# Patient Record
Sex: FEMALE | Race: BLACK OR AFRICAN AMERICAN | NOT HISPANIC OR LATINO | Employment: OTHER | URBAN - METROPOLITAN AREA
[De-identification: names, ages, dates, MRNs, and addresses within clinical notes are randomized per-mention and may not be internally consistent; named-entity substitution may affect disease eponyms.]

---

## 2018-12-18 ENCOUNTER — CLINICAL SUPPORT (OUTPATIENT)
Dept: AUDIOLOGY | Facility: CLINIC | Age: 68
End: 2018-12-18
Payer: MEDICARE

## 2018-12-18 DIAGNOSIS — H81.8X9 OTHER DISORDERS OF VESTIBULAR FUNCTION, UNSPECIFIED EAR: Primary | ICD-10-CM

## 2018-12-18 PROCEDURE — 92537 CALORIC VSTBLR TEST W/REC: CPT | Mod: S$GLB,,, | Performed by: AUDIOLOGIST

## 2018-12-18 PROCEDURE — 92540 BASIC VESTIBULAR EVALUATION: CPT | Mod: S$GLB,,, | Performed by: AUDIOLOGIST

## 2018-12-18 NOTE — Clinical Note
Please see results of today's VNG evaluation.  Please let me know if I can provide additional information.Thank you,Tracie Macias, CCC-A

## 2018-12-18 NOTE — PROGRESS NOTES
VNG/Postuography Evaluation    Referring physician:  Dr. Clement    68 y.o. female complains of dizziness, lightheadedness, and imbalance.  Symptoms are provoked by quick head turns, airising from bed, reclining into bed, bending over and looking up and have been recurring over the past 4 months.  Ms. Babin reported that her dizziness occurs every day and each episode lasts for a few seconds.  She denied taking any medication that would affect today's testing.    Gaze nystagmus was absent.    Oculomotor function was normal.    Spontaneous nystagmus was absent.    The head-hanging left Hallpike revealed <clinically insignificant, non-fatiguing> nystagmus: 2 d/s left-beating in the supine position    The head-hanging right Hallpike was negative.    Static positional testing revealed <clinically insignificant, non-fatiguing> nystagmus in one head position: 3 d/s right-beating in the head center position.    Unilateral weakness: 2% (left)  Directional preponderance 7% (left-beating)    RC: 23 d/s   d/s  RW: 24 d/s  LW: 26 d/s    Fixation suppression was normal.    Impression: The presence of clinically insignificant positional nystagmus suggests a non-localizing vestibular abnormality that is incompletely compensated.      Recommend: A trial with [place] exercises.  A copy of these exercises was provided at today's appointment.

## 2020-10-13 ENCOUNTER — LAB VISIT (OUTPATIENT)
Dept: LAB | Facility: OTHER | Age: 70
End: 2020-10-13
Payer: MEDICARE

## 2020-10-13 DIAGNOSIS — Z03.818 ENCOUNTER FOR OBSERVATION FOR SUSPECTED EXPOSURE TO OTHER BIOLOGICAL AGENTS RULED OUT: ICD-10-CM

## 2020-10-13 PROCEDURE — U0003 INFECTIOUS AGENT DETECTION BY NUCLEIC ACID (DNA OR RNA); SEVERE ACUTE RESPIRATORY SYNDROME CORONAVIRUS 2 (SARS-COV-2) (CORONAVIRUS DISEASE [COVID-19]), AMPLIFIED PROBE TECHNIQUE, MAKING USE OF HIGH THROUGHPUT TECHNOLOGIES AS DESCRIBED BY CMS-2020-01-R: HCPCS

## 2020-10-14 LAB — SARS-COV-2 RNA RESP QL NAA+PROBE: NOT DETECTED

## 2020-10-20 DIAGNOSIS — Z03.818 ENCOUNTER FOR OBSERVATION FOR SUSPECTED EXPOSURE TO OTHER BIOLOGICAL AGENTS RULED OUT: ICD-10-CM

## 2021-04-15 ENCOUNTER — PATIENT MESSAGE (OUTPATIENT)
Dept: RESEARCH | Facility: HOSPITAL | Age: 71
End: 2021-04-15

## 2021-07-01 ENCOUNTER — PATIENT MESSAGE (OUTPATIENT)
Dept: ADMINISTRATIVE | Facility: OTHER | Age: 71
End: 2021-07-01